# Patient Record
Sex: FEMALE | Race: WHITE | NOT HISPANIC OR LATINO | URBAN - NONMETROPOLITAN AREA
[De-identification: names, ages, dates, MRNs, and addresses within clinical notes are randomized per-mention and may not be internally consistent; named-entity substitution may affect disease eponyms.]

---

## 2018-05-08 NOTE — PATIENT DISCUSSION
Discussed risks of contact lenses including importance of hand washing, contact lens hygiene, and no swimming.

## 2019-09-30 ENCOUNTER — IMPORTED ENCOUNTER (OUTPATIENT)
Dept: URBAN - NONMETROPOLITAN AREA CLINIC 1 | Facility: CLINIC | Age: 29
End: 2019-09-30

## 2019-09-30 PROBLEM — H52.223: Noted: 2019-09-30

## 2019-09-30 PROCEDURE — S0620 ROUTINE OPHTHALMOLOGICAL EXA: HCPCS

## 2020-05-12 NOTE — PROCEDURE NOTE: CLINICAL
PROCEDURE NOTE: Punctal Plugs, Silicone #1 Right Lower Lid. Prior to treatment, the risks/benefits/alternatives were discussed. The patient wished to proceed with procedure. One drop of proparacaine was placed and a drop of lidocaine gel was placed over the puncta. 0.7 mm permanent silicone plugs were inserted in * eyelids. Patient tolerated procedure well. There were no complications. Post procedure instructions given. Bill Sorensen

## 2021-01-29 NOTE — PATIENT DISCUSSION
Order OCT today - shows slight decrease OD and OS but previous test was on Optovue, today's test on HRT.  Repeat at pt's comp 5/2021.

## 2021-07-29 NOTE — PATIENT DISCUSSION
LORazepam (ATIVAN) 1 MG tablet     Last Written Prescription Date:  1/22/21  Last Fill Quantity: 90,   # refills: 0  Last Office Visit: 3/31/20  Future Office visit:    Next 5 appointments (look out 90 days)    Mar 26, 2021 10:00 AM  (Arrive by 9:45 AM)  PHYSICAL with Dary Goddard CNP  [facility] ([facility] ) [address]  [place] [address]  [phone]           Routing refill request to provider for review/approval    Monitor.

## 2022-04-09 ASSESSMENT — VISUAL ACUITY
OS_CC: 20/25
OD_CC: 20/25

## 2022-04-09 ASSESSMENT — TONOMETRY
OS_IOP_MMHG: 18
OD_IOP_MMHG: 18